# Patient Record
Sex: MALE | Race: BLACK OR AFRICAN AMERICAN | NOT HISPANIC OR LATINO | Employment: FULL TIME | URBAN - METROPOLITAN AREA
[De-identification: names, ages, dates, MRNs, and addresses within clinical notes are randomized per-mention and may not be internally consistent; named-entity substitution may affect disease eponyms.]

---

## 2017-03-20 RX ORDER — INDOMETHACIN 75 MG/1
75 CAPSULE, EXTENDED RELEASE ORAL 2 TIMES DAILY WITH MEALS
Qty: 60 CAPSULE | Refills: 1 | Status: SHIPPED | OUTPATIENT
Start: 2017-03-20 | End: 2017-04-19

## 2017-03-20 RX ORDER — CHLORZOXAZONE 500 MG/1
TABLET ORAL
Qty: 60 TABLET | Refills: 0 | Status: SHIPPED | OUTPATIENT
Start: 2017-03-20

## 2017-03-20 RX ORDER — ESOMEPRAZOLE MAGNESIUM 40 MG/1
40 CAPSULE, DELAYED RELEASE ORAL
Qty: 30 CAPSULE | Refills: 1 | Status: SHIPPED | OUTPATIENT
Start: 2017-03-20 | End: 2018-03-20

## 2017-04-13 ENCOUNTER — OFFICE VISIT (OUTPATIENT)
Dept: SPORTS MEDICINE | Facility: CLINIC | Age: 27
End: 2017-04-13
Payer: COMMERCIAL

## 2017-04-13 DIAGNOSIS — S93.492A SPRAIN OF OTHER LIGAMENT OF LEFT ANKLE, INITIAL ENCOUNTER: Primary | ICD-10-CM

## 2017-04-13 PROCEDURE — 99211 OFF/OP EST MAY X REQ PHY/QHP: CPT | Mod: PBBFAC,PO | Performed by: FAMILY MEDICINE

## 2017-04-13 PROCEDURE — 99999 PR PBB SHADOW E&M-EST. PATIENT-LVL I: CPT | Mod: PBBFAC,,, | Performed by: FAMILY MEDICINE

## 2017-04-13 PROCEDURE — 99499 UNLISTED E&M SERVICE: CPT | Mod: S$PBB,,, | Performed by: ORTHOPAEDIC SURGERY

## 2017-04-13 NOTE — PROGRESS NOTES
C.C. Left ankle pain      ORTHOPAEDIC HISTORY:   1. History of right hamstring strain March 2016 ([facility]).  Missed several games.  Conservative treatment.  No injections.  Returned in same month.  Played without issues rest of season.  No symptoms at end of season.  No current symptoms  2. Right anterior talus contusion (Oct 12, 2016)  3. Left 1st MT dorsal bone contusion (Nov 2016)     Exit Physical 4-    ATFL +ttp, no ttp great toe  g2 translation LEFT ankle  No effusion, no swelling  Exam otherwise normal    Plan:  going to [facility]  Will do ankle strengthening and proprioception after 1 week of RICE   indocin

## 2017-08-30 ENCOUNTER — TELEPHONE (OUTPATIENT)
Dept: SPORTS MEDICINE | Facility: CLINIC | Age: 27
End: 2017-08-30

## 2017-08-30 DIAGNOSIS — Z02.5 ROUTINE SPORTS PHYSICAL EXAM: Primary | ICD-10-CM

## 2017-09-05 ENCOUNTER — HOSPITAL ENCOUNTER (OUTPATIENT)
Dept: CARDIOLOGY | Facility: CLINIC | Age: 27
Discharge: HOME | End: 2017-09-05
Payer: COMMERCIAL

## 2017-09-05 DIAGNOSIS — I36.9 NONRHEUMATIC TRICUSPID VALVE DISORDER: ICD-10-CM

## 2017-09-05 DIAGNOSIS — Z02.5 ROUTINE SPORTS PHYSICAL EXAM: ICD-10-CM

## 2017-09-05 LAB
DIASTOLIC DYSFUNCTION: NO
ESTIMATED PA SYSTOLIC PRESSURE: 23.25
MITRAL VALVE REGURGITATION: NORMAL
RETIRED EF AND QEF - SEE NOTES: 63 (ref 55–65)
TRICUSPID VALVE REGURGITATION: NORMAL

## 2017-09-05 PROCEDURE — 93351 STRESS TTE COMPLETE: CPT | Mod: 26,S$PBB,, | Performed by: INTERNAL MEDICINE

## 2017-09-05 PROCEDURE — 93325 DOPPLER ECHO COLOR FLOW MAPG: CPT | Mod: PBBFAC | Performed by: INTERNAL MEDICINE

## 2017-09-05 PROCEDURE — 93320 DOPPLER ECHO COMPLETE: CPT | Mod: 26,S$PBB,, | Performed by: INTERNAL MEDICINE

## 2017-09-06 ENCOUNTER — CLINICAL SUPPORT (OUTPATIENT)
Dept: INTERNAL MEDICINE | Facility: CLINIC | Age: 27
End: 2017-09-06

## 2017-09-06 ENCOUNTER — OFFICE VISIT (OUTPATIENT)
Dept: SPORTS MEDICINE | Facility: CLINIC | Age: 27
End: 2017-09-06
Payer: COMMERCIAL

## 2017-09-06 ENCOUNTER — OFFICE VISIT (OUTPATIENT)
Dept: SPORTS MEDICINE | Facility: CLINIC | Age: 27
End: 2017-09-06

## 2017-09-06 DIAGNOSIS — Z00.00 ROUTINE GENERAL MEDICAL EXAMINATION AT A HEALTH CARE FACILITY: Primary | ICD-10-CM

## 2017-09-06 DIAGNOSIS — Z00.00 WELLNESS EXAMINATION: Primary | ICD-10-CM

## 2017-09-06 LAB
25(OH)D3+25(OH)D2 SERPL-MCNC: 43 NG/ML
ALBUMIN SERPL BCP-MCNC: 4.4 G/DL
ALP SERPL-CCNC: 83 U/L
ALT SERPL W/O P-5'-P-CCNC: 32 U/L
ANION GAP SERPL CALC-SCNC: 10 MMOL/L
AST SERPL-CCNC: 38 U/L
BACTERIA #/AREA URNS AUTO: ABNORMAL /HPF
BASOPHILS # BLD AUTO: 0.01 K/UL
BASOPHILS NFR BLD: 0.1 %
BILIRUB SERPL-MCNC: 1.3 MG/DL
BILIRUB UR QL STRIP: NEGATIVE
BUN SERPL-MCNC: 23 MG/DL
CALCIUM SERPL-MCNC: 10.1 MG/DL
CHLORIDE SERPL-SCNC: 102 MMOL/L
CHOLEST SERPL-MCNC: 208 MG/DL
CHOLEST/HDLC SERPL: 2.9 {RATIO}
CLARITY UR REFRACT.AUTO: ABNORMAL
CO2 SERPL-SCNC: 28 MMOL/L
COLOR UR AUTO: YELLOW
CREAT SERPL-MCNC: 1.9 MG/DL
DIFFERENTIAL METHOD: ABNORMAL
EOSINOPHIL # BLD AUTO: 0 K/UL
EOSINOPHIL NFR BLD: 0.1 %
ERYTHROCYTE [DISTWIDTH] IN BLOOD BY AUTOMATED COUNT: 13.8 %
EST. GFR  (AFRICAN AMERICAN): 54.3 ML/MIN/1.73 M^2
EST. GFR  (NON AFRICAN AMERICAN): 46.9 ML/MIN/1.73 M^2
GLUCOSE SERPL-MCNC: 57 MG/DL
GLUCOSE UR QL STRIP: NEGATIVE
HCT VFR BLD AUTO: 39.1 %
HDLC SERPL-MCNC: 72 MG/DL
HDLC SERPL: 34.6 %
HGB BLD-MCNC: 13.5 G/DL
HGB UR QL STRIP: NEGATIVE
HYALINE CASTS UR QL AUTO: 6 /LPF
KETONES UR QL STRIP: NEGATIVE
LDLC SERPL CALC-MCNC: 121.2 MG/DL
LEUKOCYTE ESTERASE UR QL STRIP: NEGATIVE
LYMPHOCYTES # BLD AUTO: 0.9 K/UL
LYMPHOCYTES NFR BLD: 10.9 %
MCH RBC QN AUTO: 26.8 PG
MCHC RBC AUTO-ENTMCNC: 34.5 G/DL
MCV RBC AUTO: 78 FL
MICROSCOPIC COMMENT: ABNORMAL
MONOCYTES # BLD AUTO: 0.4 K/UL
MONOCYTES NFR BLD: 4.4 %
NEUTROPHILS # BLD AUTO: 6.7 K/UL
NEUTROPHILS NFR BLD: 84.5 %
NITRITE UR QL STRIP: NEGATIVE
NON-SQ EPI CELLS #/AREA URNS AUTO: <1 /HPF
NONHDLC SERPL-MCNC: 136 MG/DL
PH UR STRIP: 5 [PH] (ref 5–8)
PLATELET # BLD AUTO: 168 K/UL
PMV BLD AUTO: 12.8 FL
POTASSIUM SERPL-SCNC: 5.2 MMOL/L
PROT SERPL-MCNC: 8.5 G/DL
PROT UR QL STRIP: ABNORMAL
RBC # BLD AUTO: 5.03 M/UL
RBC #/AREA URNS AUTO: 0 /HPF (ref 0–4)
SODIUM SERPL-SCNC: 140 MMOL/L
SP GR UR STRIP: 1.02 (ref 1–1.03)
SQUAMOUS #/AREA URNS AUTO: 1 /HPF
TRIGL SERPL-MCNC: 74 MG/DL
URN SPEC COLLECT METH UR: ABNORMAL
UROBILINOGEN UR STRIP-ACNC: NEGATIVE EU/DL
WBC # BLD AUTO: 7.95 K/UL
WBC #/AREA URNS AUTO: 2 /HPF (ref 0–5)

## 2017-09-06 PROCEDURE — 99999 PR PBB SHADOW E&M-EST. PATIENT-LVL I: CPT | Mod: PBBFAC,,, | Performed by: FAMILY MEDICINE

## 2017-09-06 PROCEDURE — 99211 OFF/OP EST MAY X REQ PHY/QHP: CPT | Mod: PBBFAC,PO | Performed by: ORTHOPAEDIC SURGERY

## 2017-09-06 PROCEDURE — 99211 OFF/OP EST MAY X REQ PHY/QHP: CPT | Mod: PBBFAC,27,PO | Performed by: FAMILY MEDICINE

## 2017-09-06 PROCEDURE — 99499 UNLISTED E&M SERVICE: CPT | Mod: S$PBB,,, | Performed by: ORTHOPAEDIC SURGERY

## 2017-09-06 PROCEDURE — 99999 PR PBB SHADOW E&M-EST. PATIENT-LVL I: CPT | Mod: PBBFAC,,, | Performed by: ORTHOPAEDIC SURGERY

## 2017-09-06 PROCEDURE — 82306 VITAMIN D 25 HYDROXY: CPT

## 2017-09-06 PROCEDURE — 81001 URINALYSIS AUTO W/SCOPE: CPT

## 2017-09-06 PROCEDURE — 86480 TB TEST CELL IMMUN MEASURE: CPT

## 2017-09-06 PROCEDURE — 83036 HEMOGLOBIN GLYCOSYLATED A1C: CPT

## 2017-09-06 PROCEDURE — 99214 OFFICE O/P EST MOD 30 MIN: CPT | Mod: S$PBB,,, | Performed by: FAMILY MEDICINE

## 2017-09-06 PROCEDURE — 87591 N.GONORRHOEAE DNA AMP PROB: CPT

## 2017-09-06 PROCEDURE — 85025 COMPLETE CBC W/AUTO DIFF WBC: CPT

## 2017-09-06 PROCEDURE — 80053 COMPREHEN METABOLIC PANEL: CPT

## 2017-09-06 PROCEDURE — 80061 LIPID PANEL: CPT

## 2017-09-06 NOTE — PROGRESS NOTES
ZAHRA.C. Pre-particpation physical    27 y.o. year-old [facility]       RECENT HISTORY:   1. No current or recent symptoms    ORTHOPAEDIC HISTORY:   1. History of right hamstring strain March 2016 ([facility]).  Missed several games.  Conservative treatment.  No injections.  Returned in same month.  Played without issues rest of season.  No symptoms at end of season.  No current symptoms  2. Right anterior talus contusion (Oct 12, 2016)  3. Left 1st MT dorsal bone contusion (Nov 2016)     PHYSICAL EXAM:      GEN: Alert and oriented x3. In no apparent distress.     Well-developed, well-nourished male. Observation of ears, eyes, and nose   reveal no gross abnormalities.  See ophthal report for full eye exam    CERVICAL SPINE: Full range of motion with no deficits.     BILATERAL SHOULDER EXAM: shows full symmetric range of motion with 5/5   strength throughout the supraspinatus and infraspinatus, deltoid, and   subscapularis. No evidence of instability.     BILATERAL ELBOW EXAM: Shows full and symmetric extension/flexion, and   pronation/supination, and varus/valgus stability. Negative posterolateral   rotatory instability.     BILATERAL WRIST EXAM: Shows normal and symmetric full flexion/extension   and radial/ulnar deviation.     HAND EXAM: Shows full range of motion to bilateral hands and full   strength and stability to all fingers.     LUMBAR SPINE EXAM: Shows no evidence of any scoliosis. He has full   flexion and extension with no pain and no apparent tenderness to the   spine. Negative straight leg raise bilaterally.     HIP EXAM: Shows full range of motion without pain or signs of impingement.   Symmetric internal rotation without pain.  Has 5/5 hip flexion strength and 5/5 strength testing to the entire lower extremity.     KNEE EXAM: Examination of bilateral knees shows symmetric range of motion   0 to 145 degrees with negative Lachman and stable to varus-valgus stress   testing. No joint line  tenderness. Good quad tone. No effusion of either knee.     Bilateral legs: NTTP over tibiae mid shaft or distal.      FOOT AND ANKLE EXAM: Shows good range of motion to bilateral ankles with   no neurologic deficit. There is a 5/5 strength exam throughout the foot   and ankle exam. There is a normal arch on both feet. There is no tenderness to palpation along either foot. - turf toe exam      IMAGING:   Xrays: No evidence of any fracture or dislocation.    CT scan:    MRI:      ASSESSMENT:   Orthopedically cleared for participation for the [place] [facility] for the 2017-18 season.

## 2017-09-07 LAB
C TRACH DNA SPEC QL NAA+PROBE: NOT DETECTED
ESTIMATED AVG GLUCOSE: 103 MG/DL
HBA1C MFR BLD HPLC: 5.2 %
N GONORRHOEA DNA SPEC QL NAA+PROBE: NOT DETECTED

## 2017-09-08 LAB
MITOGEN NIL: 1.46 IU/ML
NIL: 0.06 IU/ML
TB ANTIGEN NIL: -0.01 IU/ML
TB ANTIGEN: 0.05 IU/ML
TB GOLD: NEGATIVE

## 2017-09-28 NOTE — PROGRESS NOTES
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      Assessment:   #1 pre season medical evaluation   #2 ongoing medical concerns:    A) none    Imaging studies reviewed:   none     Plan:     Lionel Dillard is cleared [facility]     Physical exam, routine Z00.00      Reviewed 17.07.19

## 2018-01-12 RX ORDER — MELOXICAM 15 MG/1
15 TABLET ORAL DAILY
Qty: 30 TABLET | Refills: 1 | Status: SHIPPED | OUTPATIENT
Start: 2018-01-12

## 2018-01-12 RX ORDER — CHLORZOXAZONE 500 MG/1
500 TABLET ORAL 3 TIMES DAILY PRN
Qty: 60 TABLET | Refills: 1 | Status: SHIPPED | OUTPATIENT
Start: 2018-01-12 | End: 2018-01-22

## 2018-01-27 ENCOUNTER — HOSPITAL ENCOUNTER (OUTPATIENT)
Dept: RADIOLOGY | Facility: HOSPITAL | Age: 28
Discharge: HOME | End: 2018-01-27
Attending: ORTHOPAEDIC SURGERY
Payer: COMMERCIAL

## 2018-01-27 DIAGNOSIS — M25.561 ACUTE PAIN OF RIGHT KNEE: Primary | ICD-10-CM

## 2018-01-27 DIAGNOSIS — M25.561 ACUTE PAIN OF RIGHT KNEE: ICD-10-CM

## 2018-01-27 PROCEDURE — 73562 X-RAY EXAM OF KNEE 3: CPT | Mod: TC,RT

## 2018-01-27 PROCEDURE — 73721 MRI JNT OF LWR EXTRE W/O DYE: CPT | Mod: 26,RT,, | Performed by: RADIOLOGY

## 2018-01-27 PROCEDURE — 73562 X-RAY EXAM OF KNEE 3: CPT | Mod: 26,RT,, | Performed by: RADIOLOGY

## 2018-01-27 PROCEDURE — 73721 MRI JNT OF LWR EXTRE W/O DYE: CPT | Mod: TC,RT

## 2018-01-29 ENCOUNTER — DOCUMENTATION ONLY (OUTPATIENT)
Dept: SPORTS MEDICINE | Facility: CLINIC | Age: 28
End: 2018-01-29

## 2018-01-29 NOTE — PROGRESS NOTES
C.C. Right knee pain    27 y.o. year-old [place]  with right knee pain.      RECENT HISTORY:   1. Sustained blow to right fibular head area during game 1-26-17.  Was able to continue and finish game.  Slightly better next day.    ORTHOPAEDIC HISTORY:   1. History of right hamstring strain March 2016 ([facility]).  Missed several games.  Conservative treatment.  No injections.  Returned in same month.  Played without issues rest of season.  No symptoms at end of season.  No current symptoms  2. Right anterior talus contusion (Oct 12, 2016)  3. Left 1st MT dorsal bone contusion (Nov 2016)     PHYSICAL EXAM:      GEN: Alert and oriented x3. In no apparent distress.     Well-developed, well-nourished male. Observation of ears, eyes, and nose   reveal no gross abnormalities.  See ophthal report for full eye exam    CERVICAL SPINE: Full range of motion with no deficits.     BILATERAL SHOULDER EXAM: shows full symmetric range of motion with 5/5   strength throughout the supraspinatus and infraspinatus, deltoid, and   subscapularis. No evidence of instability.     BILATERAL ELBOW EXAM: Shows full and symmetric extension/flexion, and   pronation/supination, and varus/valgus stability. Negative posterolateral   rotatory instability.     BILATERAL WRIST EXAM: Shows normal and symmetric full flexion/extension   and radial/ulnar deviation.     HAND EXAM: Shows full range of motion to bilateral hands and full   strength and stability to all fingers.     LUMBAR SPINE EXAM: Shows no evidence of any scoliosis. He has full   flexion and extension with no pain and no apparent tenderness to the   spine. Negative straight leg raise bilaterally.     HIP EXAM: Shows full range of motion without pain or signs of impingement.   Symmetric internal rotation without pain.  Has 5/5 hip flexion strength and 5/5 strength testing to the entire lower extremity.     KNEE EXAM: Examination of bilateral knees shows symmetric range of  motion   0 to 145 degrees with negative Lachman and stable to varus-valgus stress   testing. No joint line tenderness. Good quad tone. No effusion of either knee.   + TTP right fibular head, stable tib-fib joint    Bilateral legs: NTTP over tibiae mid shaft or distal.      FOOT AND ANKLE EXAM: Shows good range of motion to bilateral ankles with   no neurologic deficit. There is a 5/5 strength exam throughout the foot   and ankle exam. There is a normal arch on both feet. There is no tenderness to palpation along either foot. - turf toe exam      IMAGING:   Xrays: No evidence of any fracture or dislocation.        MRI:  MRI of the  RIGHT  knee without contrast.    Technique: The following sequences were obtained: Localizer; coronal PD FS and PD; sagittal T1, PD FS; axial PD FS.  Additional T1 and inversion recovery sequences were obtained of the tibia and fibula  Comparison:  No    Findings:     Menisci:  There is no tear of the medial or lateral meniscus.The root attachment of the menisci are normal.       Ligaments:  ACL and PCL are intact.MCL, and LCL complex are intact.      Tendons:  The quadriceps and patellar tendons are normal with note made of distal quadriceps enthesopathy as confirmed on plain film examination 1/27/2018.     Cartilage:   Patellofemoral: The medial and lateral patellar facets demonstrate normal articular cartilage. The retinacular attachments are intact.  Medial tibiofemoral: Articular cartilage is maintained.  Lateral tibiofemoral: Articular cartilage is maintained.    Bone: No fracture or marrow replacing process.    Miscellaneous: There is no joint effusion; small amount of joint fluid identified within physiologic limits of normal.Posterior medial and posterior lateral corners of the knee are intact.The gastrocnemius muscles are normal.     *At the point of of marker, referencing patient's point of maximal tenderness, there is evidence for edema identified at the level of the  anterolateral aspect of the knee, just inferior to the joint line, with myofascial edema, with additional edema identified level of the tibialis anterior, extensor digitorum longus, and fibular longus consistent with soft tissue injury/contusion and likely concomitant proximal muscular contusion at those levels.    Findings discussed with Dr. Lopez at time of examination.   Impression         No evidence for fibular fracture.    Soft tissue contusion anterolaterally, inferior to joint line as noted above.            ASSESSMENT:   Right fibular head contusion, MRi and xrays to r/o fracture and contusion and eval soft tissue damage    Mobic

## 2018-05-09 ENCOUNTER — OFFICE VISIT (OUTPATIENT)
Dept: SPORTS MEDICINE | Facility: CLINIC | Age: 28
End: 2018-05-09
Payer: COMMERCIAL

## 2018-05-09 DIAGNOSIS — M25.561 ACUTE PAIN OF RIGHT KNEE: Primary | ICD-10-CM

## 2018-05-09 PROCEDURE — 99211 OFF/OP EST MAY X REQ PHY/QHP: CPT | Mod: PBBFAC,PO | Performed by: ORTHOPAEDIC SURGERY

## 2018-05-09 PROCEDURE — 99999 PR PBB SHADOW E&M-EST. PATIENT-LVL I: CPT | Mod: PBBFAC,,, | Performed by: ORTHOPAEDIC SURGERY

## 2018-05-09 PROCEDURE — 99214 OFFICE O/P EST MOD 30 MIN: CPT | Mod: S$PBB,,, | Performed by: ORTHOPAEDIC SURGERY

## 2018-05-09 NOTE — PROGRESS NOTES
DARRIUS. Right knee pain    28 y.o. year-old [place]  with right knee pain.      RECENT HISTORY:    Right knee medial quadriceps tendon pain over the last three weeks. Pt with acute worsening 5/8/18    ORTHOPAEDIC HISTORY:   1. History of right hamstring strain March 2016 ([facility]).  Missed several games.  Conservative treatment.  No injections.  Returned in same month.  Played without issues rest of season.  No symptoms at end of season.  No current symptoms  2. Right anterior talus contusion (Oct 12, 2016)  3. Left 1st MT dorsal bone contusion (Nov 2016)   Sustained blow to right fibular head area during game 1-26-17.  Was able to continue and finish game.  Slightly better next day.  PHYSICAL EXAM:      GEN: Alert and oriented x3. In no apparent distress.     Well-developed, well-nourished male. Observation of ears, eyes, and nose   reveal no gross abnormalities.  See ophthal report for full eye exam    CERVICAL SPINE: Full range of motion with no deficits.     BILATERAL SHOULDER EXAM: shows full symmetric range of motion with 5/5   strength throughout the supraspinatus and infraspinatus, deltoid, and   subscapularis. No evidence of instability.     BILATERAL ELBOW EXAM: Shows full and symmetric extension/flexion, and   pronation/supination, and varus/valgus stability. Negative posterolateral   rotatory instability.     BILATERAL WRIST EXAM: Shows normal and symmetric full flexion/extension   and radial/ulnar deviation.     HAND EXAM: Shows full range of motion to bilateral hands and full   strength and stability to all fingers.     LUMBAR SPINE EXAM: Shows no evidence of any scoliosis. He has full   flexion and extension with no pain and no apparent tenderness to the   spine. Negative straight leg raise bilaterally.     HIP EXAM: Shows full range of motion without pain or signs of impingement.   Symmetric internal rotation without pain.  Has 5/5 hip flexion strength and 5/5 strength testing to the  entire lower extremity.     KNEE EXAM: Examination of bilateral knees shows symmetric range of motion   0 to 145 degrees with negative Lachman and stable to varus-valgus stress   testing. No joint line tenderness. Good quad tone. No effusion of either knee.   + TTP right quad tendon    Bilateral legs: NTTP over tibiae mid shaft or distal.      FOOT AND ANKLE EXAM: Shows good range of motion to bilateral ankles with   no neurologic deficit. There is a 5/5 strength exam throughout the foot   and ankle exam. There is a normal arch on both feet. There is no tenderness to palpation along either foot. - turf toe exam      IMAGING:   Xrays: No evidence of any fracture or dislocation.        MRI:  MRI of the  RIGHT  knee without contrast.    Technique: The following sequences were obtained: Localizer; coronal PD FS and PD; sagittal T1, PD FS; axial PD FS.  Additional T1 and inversion recovery sequences were obtained of the tibia and fibula  Comparison:  No    Findings:     Menisci:  There is no tear of the medial or lateral meniscus.The root attachment of the menisci are normal.       Ligaments:  ACL and PCL are intact.MCL, and LCL complex are intact.      Tendons:  The quadriceps and patellar tendons are normal with note made of distal quadriceps enthesopathy as confirmed on plain film examination 1/27/2018.     Cartilage:   Patellofemoral: The medial and lateral patellar facets demonstrate normal articular cartilage. The retinacular attachments are intact.  Medial tibiofemoral: Articular cartilage is maintained.  Lateral tibiofemoral: Articular cartilage is maintained.    Bone: No fracture or marrow replacing process.    Miscellaneous: There is no joint effusion; small amount of joint fluid identified within physiologic limits of normal.Posterior medial and posterior lateral corners of the knee are intact.The gastrocnemius muscles are normal.     *At the point of of marker, referencing patient's point of maximal  tenderness, there is evidence for edema identified at the level of the anterolateral aspect of the knee, just inferior to the joint line, with myofascial edema, with additional edema identified level of the tibialis anterior, extensor digitorum longus, and fibular longus consistent with soft tissue injury/contusion and likely concomitant proximal muscular contusion at those levels.    Findings discussed with Dr. Lopez at time of examination.   Impression         No evidence for fibular fracture.    Soft tissue contusion anterolaterally, inferior to joint line as noted above.            ASSESSMENT:   Right knee MRI

## 2018-05-11 ENCOUNTER — HOSPITAL ENCOUNTER (OUTPATIENT)
Dept: RADIOLOGY | Facility: HOSPITAL | Age: 28
Discharge: HOME | End: 2018-05-11
Attending: ORTHOPAEDIC SURGERY
Payer: COMMERCIAL

## 2018-05-11 DIAGNOSIS — M25.561 ACUTE PAIN OF RIGHT KNEE: ICD-10-CM

## 2018-05-11 PROCEDURE — 73721 MRI JNT OF LWR EXTRE W/O DYE: CPT | Mod: 26,RT,, | Performed by: RADIOLOGY

## 2018-05-11 PROCEDURE — 73721 MRI JNT OF LWR EXTRE W/O DYE: CPT | Mod: TC,RT

## 2018-08-31 ENCOUNTER — TELEPHONE (OUTPATIENT)
Dept: SPORTS MEDICINE | Facility: CLINIC | Age: 28
End: 2018-08-31

## 2018-08-31 DIAGNOSIS — Z02.5 ROUTINE SPORTS PHYSICAL EXAM: Primary | ICD-10-CM

## 2018-09-14 ENCOUNTER — HOSPITAL ENCOUNTER (OUTPATIENT)
Dept: CARDIOLOGY | Facility: CLINIC | Age: 28
Discharge: HOME | End: 2018-09-14
Attending: FAMILY MEDICINE
Payer: COMMERCIAL

## 2018-09-14 DIAGNOSIS — Z02.5 ROUTINE SPORTS PHYSICAL EXAM: ICD-10-CM

## 2018-09-14 LAB
DIASTOLIC DYSFUNCTION: NO
ESTIMATED PA SYSTOLIC PRESSURE: 19.32
MITRAL VALVE REGURGITATION: NORMAL
RETIRED EF AND QEF - SEE NOTES: 58 (ref 55–65)
TRICUSPID VALVE REGURGITATION: NORMAL

## 2018-09-14 PROCEDURE — 93325 DOPPLER ECHO COLOR FLOW MAPG: CPT | Mod: PBBFAC | Performed by: INTERNAL MEDICINE

## 2018-09-14 PROCEDURE — 93320 DOPPLER ECHO COMPLETE: CPT | Mod: 26,S$PBB,, | Performed by: INTERNAL MEDICINE

## 2018-09-14 PROCEDURE — 93351 STRESS TTE COMPLETE: CPT | Mod: 26,S$PBB,, | Performed by: INTERNAL MEDICINE

## 2018-09-25 ENCOUNTER — OFFICE VISIT (OUTPATIENT)
Dept: SPORTS MEDICINE | Facility: CLINIC | Age: 28
End: 2018-09-25
Payer: COMMERCIAL

## 2018-09-25 ENCOUNTER — CLINICAL SUPPORT (OUTPATIENT)
Dept: INTERNAL MEDICINE | Facility: CLINIC | Age: 28
End: 2018-09-25

## 2018-09-25 ENCOUNTER — OFFICE VISIT (OUTPATIENT)
Dept: SPORTS MEDICINE | Facility: CLINIC | Age: 28
End: 2018-09-25

## 2018-09-25 VITALS
HEIGHT: 76 IN | SYSTOLIC BLOOD PRESSURE: 141 MMHG | BODY MASS INDEX: 24.84 KG/M2 | HEART RATE: 71 BPM | WEIGHT: 204 LBS | DIASTOLIC BLOOD PRESSURE: 77 MMHG

## 2018-09-25 DIAGNOSIS — Z00.00 ROUTINE MEDICAL EXAM: Primary | ICD-10-CM

## 2018-09-25 DIAGNOSIS — Z00.00 WELLNESS EXAMINATION: Primary | ICD-10-CM

## 2018-09-25 DIAGNOSIS — Z00.00 ROUTINE GENERAL MEDICAL EXAMINATION AT A HEALTH CARE FACILITY: Primary | ICD-10-CM

## 2018-09-25 DIAGNOSIS — Z02.5 ROUTINE SPORTS EXAMINATION: ICD-10-CM

## 2018-09-25 LAB
25(OH)D3+25(OH)D2 SERPL-MCNC: 41 NG/ML
ALBUMIN SERPL BCP-MCNC: 4 G/DL
ALP SERPL-CCNC: 92 U/L
ALT SERPL W/O P-5'-P-CCNC: 23 U/L
ANION GAP SERPL CALC-SCNC: 9 MMOL/L
AST SERPL-CCNC: 26 U/L
BASOPHILS # BLD AUTO: 0.02 K/UL
BASOPHILS NFR BLD: 0.5 %
BILIRUB SERPL-MCNC: 0.8 MG/DL
BILIRUB UR QL STRIP: NEGATIVE
BUN SERPL-MCNC: 16 MG/DL
CALCIUM SERPL-MCNC: 9.8 MG/DL
CHLORIDE SERPL-SCNC: 102 MMOL/L
CHOLEST SERPL-MCNC: 214 MG/DL
CHOLEST/HDLC SERPL: 3.2 {RATIO}
CLARITY UR REFRACT.AUTO: CLEAR
CO2 SERPL-SCNC: 29 MMOL/L
COLOR UR AUTO: YELLOW
CREAT SERPL-MCNC: 1.2 MG/DL
DIFFERENTIAL METHOD: ABNORMAL
EOSINOPHIL # BLD AUTO: 0.1 K/UL
EOSINOPHIL NFR BLD: 1.4 %
ERYTHROCYTE [DISTWIDTH] IN BLOOD BY AUTOMATED COUNT: 13.6 %
EST. GFR  (AFRICAN AMERICAN): >60 ML/MIN/1.73 M^2
EST. GFR  (NON AFRICAN AMERICAN): >60 ML/MIN/1.73 M^2
ESTIMATED AVG GLUCOSE: 108 MG/DL
GLUCOSE SERPL-MCNC: 113 MG/DL
GLUCOSE UR QL STRIP: NEGATIVE
HBA1C MFR BLD HPLC: 5.4 %
HCT VFR BLD AUTO: 41.8 %
HDLC SERPL-MCNC: 67 MG/DL
HDLC SERPL: 31.3 %
HGB BLD-MCNC: 14.3 G/DL
HGB UR QL STRIP: NEGATIVE
IMM GRANULOCYTES # BLD AUTO: 0.01 K/UL
IMM GRANULOCYTES NFR BLD AUTO: 0.2 %
KETONES UR QL STRIP: NEGATIVE
LDLC SERPL CALC-MCNC: 129.4 MG/DL
LEUKOCYTE ESTERASE UR QL STRIP: NEGATIVE
LYMPHOCYTES # BLD AUTO: 1.7 K/UL
LYMPHOCYTES NFR BLD: 38.2 %
MCH RBC QN AUTO: 27.1 PG
MCHC RBC AUTO-ENTMCNC: 34.2 G/DL
MCV RBC AUTO: 79 FL
MONOCYTES # BLD AUTO: 0.3 K/UL
MONOCYTES NFR BLD: 6.2 %
NEUTROPHILS # BLD AUTO: 2.3 K/UL
NEUTROPHILS NFR BLD: 53.5 %
NITRITE UR QL STRIP: NEGATIVE
NONHDLC SERPL-MCNC: 147 MG/DL
NRBC BLD-RTO: 0 /100 WBC
PH UR STRIP: 7 [PH] (ref 5–8)
PLATELET # BLD AUTO: 147 K/UL
PMV BLD AUTO: ABNORMAL FL
POTASSIUM SERPL-SCNC: 4.2 MMOL/L
PROT SERPL-MCNC: 8 G/DL
PROT UR QL STRIP: NEGATIVE
RBC # BLD AUTO: 5.28 M/UL
SODIUM SERPL-SCNC: 140 MMOL/L
SP GR UR STRIP: 1.01 (ref 1–1.03)
TRIGL SERPL-MCNC: 88 MG/DL
URN SPEC COLLECT METH UR: NORMAL
UROBILINOGEN UR STRIP-ACNC: NEGATIVE EU/DL
WBC # BLD AUTO: 4.34 K/UL

## 2018-09-25 PROCEDURE — 99212 OFFICE O/P EST SF 10 MIN: CPT | Mod: PBBFAC,PO | Performed by: FAMILY MEDICINE

## 2018-09-25 PROCEDURE — 83036 HEMOGLOBIN GLYCOSYLATED A1C: CPT

## 2018-09-25 PROCEDURE — 80053 COMPREHEN METABOLIC PANEL: CPT

## 2018-09-25 PROCEDURE — 81003 URINALYSIS AUTO W/O SCOPE: CPT

## 2018-09-25 PROCEDURE — 80061 LIPID PANEL: CPT

## 2018-09-25 PROCEDURE — 85025 COMPLETE CBC W/AUTO DIFF WBC: CPT

## 2018-09-25 PROCEDURE — 86480 TB TEST CELL IMMUN MEASURE: CPT

## 2018-09-25 PROCEDURE — 99999 PR PBB SHADOW E&M-EST. PATIENT-LVL II: CPT | Mod: PBBFAC,,, | Performed by: FAMILY MEDICINE

## 2018-09-25 PROCEDURE — 99214 OFFICE O/P EST MOD 30 MIN: CPT | Mod: S$PBB,,, | Performed by: FAMILY MEDICINE

## 2018-09-25 PROCEDURE — 99214 OFFICE O/P EST MOD 30 MIN: CPT | Mod: S$PBB,,, | Performed by: ORTHOPAEDIC SURGERY

## 2018-09-25 PROCEDURE — 87491 CHLMYD TRACH DNA AMP PROBE: CPT

## 2018-09-25 PROCEDURE — 82306 VITAMIN D 25 HYDROXY: CPT

## 2018-09-25 NOTE — PROGRESS NOTES
MALAIKA Pre-particpation physical    28 y.o. year-old [place]       ORTHOPAEDIC HISTORY:   1. History of right hamstring strain March 2016 ([facility]).  Missed several games.  Conservative treatment.  No injections.  Returned in same month.  Played without issues rest of season.  No symptoms at end of season.  No current symptoms  2. Right anterior talus contusion (Oct 12, 2016)  3. Left 1st MT dorsal bone contusion (Nov 2016)   4. Fibular head contusion 1-26-17, no missed time      PHYSICAL EXAM:      GEN: Alert and oriented x3. In no apparent distress.     Well-developed, well-nourished male. Observation of ears, eyes, and nose   reveal no gross abnormalities.  See ophthal report for full eye exam    CERVICAL SPINE: Full range of motion with no deficits.     BILATERAL SHOULDER EXAM: shows full symmetric range of motion with 5/5   strength throughout the supraspinatus and infraspinatus, deltoid, and   subscapularis. No evidence of instability.     BILATERAL ELBOW EXAM: Shows full and symmetric extension/flexion, and   pronation/supination, and varus/valgus stability. Negative posterolateral   rotatory instability.     BILATERAL WRIST EXAM: Shows normal and symmetric full flexion/extension   and radial/ulnar deviation.     HAND EXAM: Shows full range of motion to bilateral hands and full   strength and stability to all fingers.     LUMBAR SPINE EXAM: Shows no evidence of any scoliosis. He has full   flexion and extension with no pain and no apparent tenderness to the   spine. Negative straight leg raise bilaterally.     HIP EXAM: Shows full range of motion without pain or signs of impingement.   Symmetric internal rotation without pain.  Has 5/5 hip flexion strength and 5/5 strength testing to the entire lower extremity.   + R Step down test  + L step down test  + Bridge test  45 degrees internal rotation with axial load bilaterally    KNEE EXAM: Examination of bilateral knees shows symmetric range of  motion   0 to 145 degrees with negative Lachman and stable to varus-valgus stress   testing. No joint line tenderness. Good quad tone. No effusion of either knee.     Bilateral legs: NTTP over tibiae mid shaft or distal.      FOOT AND ANKLE EXAM: Shows good range of motion to bilateral ankles with   no neurologic deficit. There is a 5/5 strength exam throughout the foot   and ankle exam. There is a normal arch on both feet. There is no tenderness to palpation along either foot. - turf toe exam  L great toe with loss of 10 degrees extension, symmetric flexion  Stable Lachman  L Medial 1st MTP capsular mild edema    IMAGING:   Xrays: NA    CT scan: NA    MRI: NA      ASSESSMENT:   Orthopedically cleared for participation for the [place] [facility] for the 2018-19 season.

## 2018-09-25 NOTE — LETTER
October 3, 2018      [facility]            [facility]  [address]  [facility] [address]  Phone: [phone]          Patient: Lionel Dillard   MR Number: [id number]   YOB: 1990   Date of Visit: 9/25/2018       Dear [facility] :    Thank you for referring Lionel Dillard to me for evaluation. Attached you will find relevant portions of my assessment and plan of care.    If you have questions, please do not hesitate to call me. I look forward to following Lionel Dillard along with you.    Sincerely,    Caterina Lopez MD    Enclosure  CC:  No Recipients    If you would like to receive this communication electronically, please contact [email or web address] or [phone] to request more information on [employer] Link access.    For providers and/or their staff who would like to refer a patient to [facility], please contact us through our one-stop-shop provider referral line, [facility]resp[facility], at [phone].    If you feel you have received this communication in error or would no longer like to receive these types of communications, please e-mail [email or web address]

## 2018-09-26 LAB
C TRACH DNA SPEC QL NAA+PROBE: NOT DETECTED
N GONORRHOEA DNA SPEC QL NAA+PROBE: NOT DETECTED

## 2018-09-26 NOTE — PROGRESS NOTES
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     Sports-relevant past history and family history:  History of cardiovascular pathology: no  Family history of cardiovascular pathology at a young age: no  History of pulmonary pathology: no  History of asthma: no  History of concussion: no  Chronic medication use: no  Chronic supplement / vitamin use: one-a-day vitamin  Other current medical concerns: no  Other history per PPE: History Form (see electronic medical record)    Assessment:   #1 pre season medical evaluation  Concussion baseline evaluation per [facility]ON[facility] protocol: last completed 18.09.25  Stress echocardiogram per [facility] Patricio protocol: last performed with no evidence of stress induced myocardial ischemia on: 18.09.14    #2 ongoing medical issues:   A) none    Imaging studies reviewed as part of medical evaluation:   none    Plan:  #1  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    #2  A)    Physical exam, routine Z00.00  Sports physical Z02.5    Template reviewed 18.09.25    References:  The Preparticipation Sports Evaluation, Sept 2015  https://www.aafp.org/afp/2015/0901/p371.html    Preparticipation Physical Evaluations in Sports: A Systematic Review of Current Recommendations, July 2014  [email or web address]    Preparticipation Physical Evaluation consensus document, 2010  https://www.aafp.org/dam/AAFP/documents/patient_care/fitness/zvvnxwxefsodtx9110.pdf  [email or web address]/pdf%[email or web address]/[email or web address].pdf

## 2018-09-27 LAB
M TB IFN-G CD4+ BCKGRND COR BLD-ACNC: -0.01 IU/ML
MITOGEN IGNF BCKGRD COR BLD-ACNC: 9.02 IU/ML
MITOGEN IGNF BCKGRD COR BLD-ACNC: NEGATIVE [IU]/ML
NIL: 0.05 IU/ML
TB2 - NIL: -0.01 IU/ML

## 2018-10-25 RX ORDER — CHOLECALCIFEROL (VITAMIN D3) 1250 MCG
1 TABLET ORAL
Qty: 20 TABLET | Refills: 3 | COMMUNITY
Start: 2018-10-25 | End: 2019-03-08

## 2018-11-08 RX ORDER — CHOLECALCIFEROL (VITAMIN D3) 1250 MCG
1 TABLET ORAL
Qty: 20 TABLET | Refills: 3 | COMMUNITY
Start: 2018-11-08 | End: 2019-03-22

## 2018-12-11 ENCOUNTER — OFFICE VISIT (OUTPATIENT)
Dept: SPORTS MEDICINE | Facility: CLINIC | Age: 28
End: 2018-12-11
Payer: COMMERCIAL

## 2018-12-11 VITALS — WEIGHT: 204 LBS | HEIGHT: 76 IN | BODY MASS INDEX: 24.84 KG/M2

## 2018-12-11 DIAGNOSIS — L91.8 INFLAMED ACROCHORDON: Primary | ICD-10-CM

## 2018-12-11 PROCEDURE — 99214 OFFICE O/P EST MOD 30 MIN: CPT | Mod: 25,S$GLB,, | Performed by: FAMILY MEDICINE

## 2018-12-11 PROCEDURE — 99999 PR PBB SHADOW E&M-EST. PATIENT-LVL II: CPT | Mod: PBBFAC,,, | Performed by: FAMILY MEDICINE

## 2018-12-11 PROCEDURE — 11200 RMVL SKIN TAGS UP TO&INC 15: CPT | Mod: S$GLB,,, | Performed by: FAMILY MEDICINE

## 2018-12-11 PROCEDURE — 3008F BODY MASS INDEX DOCD: CPT | Mod: CPTII,S$GLB,, | Performed by: FAMILY MEDICINE

## 2018-12-11 NOTE — PROGRESS NOTES
"History of Present Illness (HPI):   #1  Main symptom: lesion on groin, skintag  [Onset] duration: noticed weeks ago  [Palliative] modifying factors:    [Provocative] modifying factors:   Prior: none  Progression: becoming more inflammed  Quality: sharp pain  [Radiation] associated signs and symptoms: - / +  -fever, -chills, -night sweats  -fatigue  -headache  -visual changes, -red eyes, -eye discomfort  -sinus congestion, -rhinorrhea, -epistaxis  -cough, -productive cough, -hemoptysis  -nausea, -emesis, -hematemesis  -diarrhea, -melena, -hematochezia  Severity: per nursing documentation  [Sick contacts]:   Timing: rubbed against it hard last evening, "tore" the skin tag a bit, minimal associated bleeding  [Trauma] context:    AAFP/FPM: Pocket Guide Documentation Guidelines, (c)2014    (57429=7+, 76614=8+)    Physical Examination (PE):  Vital signs reviewed   Afebrile, normotensive, normocardic   Constitution: No apparent distress  HEENT:    Head: atraumatic, normocephalic   Eyes: no conjunctivitis    Ears: EAC clear, TM atraumatic, no hemotympanum, no middle ear discharge   Nose: no nasal congestion noted   Throat: erythematous, without tonsillar edema or tonsillar exudate  Neck:    Soft   Non tender   Lymphadenopathy  Pulmonary:   LFCTAB  Cardiac:   Regular rate and rhythm   No murmurs  Abdomen:   Soft   Non tender    Assessment:   #1 acrochordon, small, midline superior groin    No evidence of infection    Imaging studies reviewed:   None    Plan:  Discussed risks, benefits, alternatives to incision   Reassuring evaluation. 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 sports, work) restrictions: as tolerated   school/vocation:     Follow up per pt  Should symptoms worsen or fail to resolve, consider:  Revisiting the above options  "

## 2018-12-11 NOTE — PROGRESS NOTES
Description of procedure: Acrochordon (skin tag) removal    A) Surgical time out  A surgical time out was performed, including verification of patient ID, procedure to be performed, site and side, availability of information and equipment, review of safety issues, and the patients agreement and consent.     B) Procedure  Skin was cleaned w/ ChloroPrep  Skin was anesthetized w/ vapocoolant, and then with 1% lidocaine  Acrochordon was excised with sterile scissors     D) Post-procedure care  Following the procedure, a sterile bandage was placed over the incision site.     Complications: none     Estimated blood loss from injection procedure: none     Disposition  The patient tolerated the procedure well and there were no immediate complications. The patient was instructed to call the clinic immediately for any mild to moderate adverse side effects, or to call 911 in the event of an emergency.

## 2019-01-07 ENCOUNTER — TELEPHONE (OUTPATIENT)
Dept: SPORTS MEDICINE | Facility: CLINIC | Age: 29
End: 2019-01-07

## 2019-01-07 RX ORDER — CLOTRIMAZOLE AND BETAMETHASONE DIPROPIONATE 10; .64 MG/G; MG/G
CREAM TOPICAL 2 TIMES DAILY
Qty: 1 TUBE | Refills: 1 | Status: SHIPPED | OUTPATIENT
Start: 2019-01-07

## 2019-01-07 NOTE — TELEPHONE ENCOUNTER
"History of Present Illness (HPI):   #1  Main symptom: rash on groin  [Onset] duration: x "several days"  [Palliative] modifying factors:    [Provocative] modifying factors:   Prior:   Progression:   Quality: burning, itching  [Radiation] associated signs and symptoms: - / +  -fever, -chills, -night sweats  -fatigue  -headache  -visual changes, -red eyes, -eye discomfort  -sinus congestion, -rhinorrhea, -epistaxis  -cough, -productive cough, -hemoptysis  -nausea, -emesis, -hematemesis  -diarrhea, -melena, -hematochezia  Severity: per nursing documentation  [Sick contacts]:   Timing:   [Trauma] context:    AAFP/FPM: Pocket Guide Documentation Guidelines, (c)2014    (54437=1+, 42951=5+)    Physical Examination (PE):  N/a    Assessment:   #1 suspected tinea cruris     Imaging studies reviewed:   None    Plan:  Begin Lotrisone x 2-4 wks bid  Discussed the importance of cleaning and drying the area multiple times a day    Follow up prn  Should symptoms worsen or fail to resolve, consider:  Revisiting the above options    "

## 2019-01-22 ENCOUNTER — HOSPITAL ENCOUNTER (OUTPATIENT)
Dept: RADIOLOGY | Facility: HOSPITAL | Age: 29
Discharge: HOME | End: 2019-01-22
Attending: ORTHOPAEDIC SURGERY
Payer: COMMERCIAL

## 2019-01-22 DIAGNOSIS — S80.02XA CONTUSION OF LEFT KNEE, INITIAL ENCOUNTER: ICD-10-CM

## 2019-01-22 DIAGNOSIS — S80.02XA CONTUSION OF LEFT KNEE, INITIAL ENCOUNTER: Primary | ICD-10-CM

## 2019-01-22 PROCEDURE — 73721 MRI JNT OF LWR EXTRE W/O DYE: CPT | Mod: TC,LT

## 2019-01-22 PROCEDURE — 73721 MRI JNT OF LWR EXTRE W/O DYE: CPT | Mod: 26,LT,, | Performed by: RADIOLOGY

## 2019-01-22 PROCEDURE — 73721 MRI KNEE WITHOUT CONTRAST LEFT: ICD-10-PCS | Mod: 26,LT,, | Performed by: RADIOLOGY

## 2019-01-22 NOTE — PROGRESS NOTES
DARRIUS. Left knee pain    28 y.o. year-old [employer] , got two blows to left knee VMO area in successive plays during game     Missed 2 games, cont pain.    ORTHOPAEDIC HISTORY:   1. History of right hamstring strain March 2016 ([facility]).  Missed several games.  Conservative treatment.  No injections.  Returned in same month.  Played without issues rest of season.  No symptoms at end of season.  No current symptoms  2. Right anterior talus contusion (Oct 12, 2016)  3. Left 1st MT dorsal bone contusion (Nov 2016)   4. Fibular head contusion 1-26-17, no missed time  5. Left VMO contusion, missed 2 games      PHYSICAL EXAM:      GEN: Alert and oriented x3. In no apparent distress.     Well-developed, well-nourished male. Observation of ears, eyes, and nose   reveal no gross abnormalities.  See ophthal report for full eye exam    CERVICAL SPINE: Full range of motion with no deficits.     BILATERAL SHOULDER EXAM: shows full symmetric range of motion with 5/5   strength throughout the supraspinatus and infraspinatus, deltoid, and   subscapularis. No evidence of instability.     BILATERAL ELBOW EXAM: Shows full and symmetric extension/flexion, and   pronation/supination, and varus/valgus stability. Negative posterolateral   rotatory instability.     BILATERAL WRIST EXAM: Shows normal and symmetric full flexion/extension   and radial/ulnar deviation.     HAND EXAM: Shows full range of motion to bilateral hands and full   strength and stability to all fingers.     LUMBAR SPINE EXAM: Shows no evidence of any scoliosis. He has full   flexion and extension with no pain and no apparent tenderness to the   spine. Negative straight leg raise bilaterally.     HIP EXAM: Shows full range of motion without pain or signs of impingement.   Symmetric internal rotation without pain.  Has 5/5 hip flexion strength and 5/5 strength testing to the entire lower extremity.     KNEE EXAM: Examination of bilateral knees shows symmetric  range of motion   0 to 145 degrees with negative Lachman and stable to varus-valgus stress   testing. No joint line tenderness. Good quad tone. No effusion of either knee. + TTP Left VMO, mild swelling diffusely.    Bilateral legs: NTTP over tibiae mid shaft or distal.      FOOT AND ANKLE EXAM: Shows good range of motion to bilateral ankles with   no neurologic deficit. There is a 5/5 strength exam throughout the foot   and ankle exam. There is a normal arch on both feet. There is no tenderness to palpation along either foot. - turf toe exam  L great toe with loss of 10 degrees extension, symmetric flexion  Stable Lachman  L Medial 1st MTP capsular mild edema    IMAGING:   Xrays: NA      ASSESSMENT:   Left VMO contusion, MRI left knee

## 2019-01-23 RX ORDER — METHYLPREDNISOLONE 4 MG/1
TABLET ORAL
Qty: 21 TABLET | Refills: 0 | Status: SHIPPED | OUTPATIENT
Start: 2019-01-23

## 2019-02-20 ENCOUNTER — CLINICAL SUPPORT (OUTPATIENT)
Dept: ADMINISTRATIVE | Facility: CLINIC | Age: 29
End: 2019-02-20

## 2019-02-20 DIAGNOSIS — Z00.00 ROUTINE GENERAL MEDICAL EXAMINATION AT A HEALTH CARE FACILITY: Primary | ICD-10-CM

## 2019-03-07 ENCOUNTER — HOSPITAL ENCOUNTER (OUTPATIENT)
Dept: RADIOLOGY | Facility: HOSPITAL | Age: 29
Discharge: HOME | End: 2019-03-07
Attending: ORTHOPAEDIC SURGERY
Payer: COMMERCIAL

## 2019-03-07 DIAGNOSIS — M25.562 ACUTE PAIN OF LEFT KNEE: ICD-10-CM

## 2019-03-07 DIAGNOSIS — M25.562 ACUTE PAIN OF LEFT KNEE: Primary | ICD-10-CM

## 2019-03-07 PROCEDURE — 73721 MRI KNEE WITHOUT CONTRAST LEFT: ICD-10-PCS | Mod: 26,LT,, | Performed by: RADIOLOGY

## 2019-03-07 PROCEDURE — 73721 MRI JNT OF LWR EXTRE W/O DYE: CPT | Mod: TC,LT

## 2019-03-07 PROCEDURE — 73721 MRI JNT OF LWR EXTRE W/O DYE: CPT | Mod: 26,LT,, | Performed by: RADIOLOGY

## 2019-03-13 DIAGNOSIS — B35.6 TINEA CRURIS: Primary | ICD-10-CM

## 2019-03-13 NOTE — TELEPHONE ENCOUNTER
Dr. Barboza requested the following for this patient:    Itraconzole 200mg po qd x 28 days (0 refills)  Dx: Tinea Cruris

## 2019-04-10 ENCOUNTER — OFFICE VISIT (OUTPATIENT)
Dept: SPORTS MEDICINE | Facility: CLINIC | Age: 29
End: 2019-04-10

## 2019-04-10 DIAGNOSIS — Z00.00 PHYSICAL EXAM, ROUTINE: Primary | ICD-10-CM

## 2019-04-10 DIAGNOSIS — Z00.00 WELLNESS EXAMINATION: Primary | ICD-10-CM

## 2019-04-10 DIAGNOSIS — Z02.5 SPORTS PHYSICAL: ICD-10-CM

## 2019-04-10 PROCEDURE — 99499 NO LOS: ICD-10-PCS | Mod: S$GLB,,, | Performed by: FAMILY MEDICINE

## 2019-04-10 PROCEDURE — 99499 UNLISTED E&M SERVICE: CPT | Mod: S$GLB,,, | Performed by: FAMILY MEDICINE

## 2019-04-10 PROCEDURE — 99999 PR PBB SHADOW E&M-EST. PATIENT-LVL I: CPT | Mod: PBBFAC,,, | Performed by: ORTHOPAEDIC SURGERY

## 2019-04-10 PROCEDURE — 99499 NO LOS: ICD-10-PCS | Mod: S$GLB,,, | Performed by: ORTHOPAEDIC SURGERY

## 2019-04-10 PROCEDURE — 99999 PR PBB SHADOW E&M-EST. PATIENT-LVL I: ICD-10-PCS | Mod: PBBFAC,,, | Performed by: ORTHOPAEDIC SURGERY

## 2019-04-10 PROCEDURE — 99499 UNLISTED E&M SERVICE: CPT | Mod: S$GLB,,, | Performed by: ORTHOPAEDIC SURGERY

## 2019-04-10 NOTE — PROGRESS NOTES
ZAHRA.C. Left knee pain    29 y.o. year-old [employer] .    Current History:  L VMO contusion: Overall doing well. States pain has greatly improved. Has returned to cutting, running, jumping at 85% for 45 minutes without pain or issues. Denies effusions/swelling    ORTHOPAEDIC HISTORY:   1. History of right hamstring strain March 2016 ([facility]).  Missed several games.  Conservative treatment.  No injections.  Returned in same month.  Played without issues rest of season.  No symptoms at end of season.  No current symptoms  2. Right anterior talus contusion (Oct 12, 2016)  3. Left 1st MT dorsal bone contusion (Nov 2016)   4. Fibular head contusion 1-26-17, no missed time  5. Jan 2019: Left VMO contusion, missed 2 games      PHYSICAL EXAM:      GEN: Alert and oriented x3. In no apparent distress.     Well-developed, well-nourished male. Observation of ears, eyes, and nose   reveal no gross abnormalities.  See ophthal report for full eye exam    CERVICAL SPINE: Full range of motion with no deficits.     BILATERAL SHOULDER EXAM: shows full symmetric range of motion with 5/5   strength throughout the supraspinatus and infraspinatus, deltoid, and   subscapularis. No evidence of instability.     BILATERAL ELBOW EXAM: Shows full and symmetric extension/flexion, and   pronation/supination, and varus/valgus stability. Negative posterolateral   rotatory instability.     BILATERAL WRIST EXAM: Shows normal and symmetric full flexion/extension   and radial/ulnar deviation.     HAND EXAM: Shows full range of motion to bilateral hands and full   strength and stability to all fingers.     LUMBAR SPINE EXAM: Shows no evidence of any scoliosis. He has full   flexion and extension with no pain and no apparent tenderness to the   spine. Negative straight leg raise bilaterally.     HIP EXAM: Shows full range of motion without pain or signs of impingement.   Symmetric internal rotation without pain.  Has 5/5 hip flexion strength  and 5/5 strength testing to the entire lower extremity.     KNEE EXAM: Examination of bilateral knees shows symmetric range of motion   0 to 145 degrees with negative Lachman and stable to varus-valgus stress   testing. No joint line tenderness. Good quad tone. No effusion of either knee. - TTP Left VMO, swelling resolved.    Bilateral legs: NTTP over tibiae mid shaft or distal.      FOOT AND ANKLE EXAM: Shows good range of motion to bilateral ankles with   no neurologic deficit. There is a 5/5 strength exam throughout the foot   and ankle exam. There is a normal arch on both feet. There is no tenderness to palpation along either foot. - turf toe exam  L great toe with loss of 10 degrees extension, symmetric flexion  Stable Lachman  L Medial 1st MTP capsular mild edema    IMAGING:   Xrays: NA      ASSESSMENT:   Left VMO contusion, MRI left knee    Cont rehab

## 2019-06-18 NOTE — PROGRESS NOTES
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      Sports-relevant history and family history:  History of cardiovascular pathology: no  Family history of cardiovascular pathology at a young age: no  History of pulmonary pathology: no  History of asthma: no  History of concussion: no  Chronic medication use: no  Chronic supplement / vitamin use: one-a-day vitamin  Other current medical concerns: no  Other history per PPE: History Form (see electronic medical record)    Assessment:   #1 post season medical review  Concussion evaluation per [facility] protocol: last completed: 18.09.25  Stress echocardiogram per [facility] protocol: last performed with no evidence of stress induced myocardial ischemia on: 18.09.14    #2 ongoing medical issues:  A) none     Imaging studies reviewed as part of medical evaluation:   none     Plan:  #1  Complete.   Acute and ongoing concerns as below:     #2  A) none        Physical exam, routine Z00.00  Sports physical Z02.5        Template reviewed 19.03.25        References:  The Preparticipation Sports Evaluation, Sept 2015  https://www.aafp.org/afp/2015/0901/p371.html     Preparticipation Physical Evaluations in Sports: A Systematic Review of Current Recommendations, July 2014  [email or web address]     Preparticipation Physical Evaluation consensus document, 2010  https://www.aafp.org/dam/AAFP/documents/patient_care/fitness/lnfjoevrvhhadz0415.pdf  Https://www.amssm.org/Content/pdf%20files/QBI9495IijwdpdMdci.pdf     https://www.[employer]Net/contents/vitamin-d-deficiency-in-adults-definition-clinical-manifestations-and-treatment?search=vitamin%20d%20replacement&source=search_result&selectedTitle=1~150&usage_type=default&display_rank=1

## 2019-08-30 ENCOUNTER — TELEPHONE (OUTPATIENT)
Dept: SPORTS MEDICINE | Facility: CLINIC | Age: 29
End: 2019-08-30

## 2019-08-30 DIAGNOSIS — Z02.5 SPORTS PHYSICAL: Primary | ICD-10-CM

## 2019-09-11 ENCOUNTER — HOSPITAL ENCOUNTER (OUTPATIENT)
Dept: CARDIOLOGY | Facility: CLINIC | Age: 29
Discharge: HOME | End: 2019-09-11
Attending: FAMILY MEDICINE

## 2019-09-11 VITALS — HEIGHT: 76 IN | WEIGHT: 205 LBS | BODY MASS INDEX: 24.96 KG/M2

## 2019-09-11 DIAGNOSIS — Z02.5 SPORTS PHYSICAL: ICD-10-CM

## 2019-09-11 LAB
ASCENDING AORTA: 2.97 CM
AV INDEX (PROSTH): 0.9
AV MEAN GRADIENT: 2 MMHG
AV PEAK GRADIENT: 4 MMHG
AV VALVE AREA: 3.9 CM2
AV VELOCITY RATIO: 0.79
BSA FOR ECHO PROCEDURE: 2.23 M2
CV ECHO LV RWT: 0.29 CM
CV STRESS BASE HR: 40 BPM
DIASTOLIC BLOOD PRESSURE: 73 MMHG
DOP CALC AO PEAK VEL: 1.04 M/S
DOP CALC AO VTI: 21.45 CM
DOP CALC LVOT AREA: 4.3 CM2
DOP CALC LVOT DIAMETER: 2.35 CM
DOP CALC LVOT PEAK VEL: 0.82 M/S
DOP CALC LVOT STROKE VOLUME: 83.63 CM3
DOP CALCLVOT PEAK VEL VTI: 19.29 CM
E WAVE DECELERATION TIME: 122.43 MSEC
E/A RATIO: 2.63
E/E' RATIO: 4.5 M/S
ECHO LV POSTERIOR WALL: 0.83 CM (ref 0.6–1.1)
FRACTIONAL SHORTENING: 31 % (ref 28–44)
INTERVENTRICULAR SEPTUM: 0.88 CM (ref 0.6–1.1)
IVRT: 0.1 MSEC
LA MAJOR: 5.7 CM
LA MINOR: 5.3 CM
LA WIDTH: 4 CM
LEFT ATRIUM SIZE: 4.03 CM
LEFT ATRIUM VOLUME INDEX: 33.6 ML/M2
LEFT ATRIUM VOLUME: 75.26 CM3
LEFT INTERNAL DIMENSION IN SYSTOLE: 4 CM (ref 2.1–4)
LEFT VENTRICLE DIASTOLIC VOLUME INDEX: 66.51 ML/M2
LEFT VENTRICLE DIASTOLIC VOLUME: 148.91 ML
LEFT VENTRICLE MASS INDEX: 85 G/M2
LEFT VENTRICLE SYSTOLIC VOLUME INDEX: 35.5 ML/M2
LEFT VENTRICLE SYSTOLIC VOLUME: 79.5 ML
LEFT VENTRICULAR INTERNAL DIMENSION IN DIASTOLE: 5.8 CM (ref 3.5–6)
LEFT VENTRICULAR MASS: 190.67 G
LV LATERAL E/E' RATIO: 3.94 M/S
LV SEPTAL E/E' RATIO: 5.25 M/S
MV PEAK A VEL: 0.24 M/S
MV PEAK E VEL: 0.63 M/S
OHS CV CPX 1 MINUTE RECOVERY HEART RATE: 120 BPM
OHS CV CPX 85 PERCENT MAX PREDICTED HEART RATE MALE: 162
OHS CV CPX ESTIMATED METS: 17
OHS CV CPX MAX PREDICTED HEART RATE: 190
OHS CV CPX PATIENT IS FEMALE: 0
OHS CV CPX PATIENT IS MALE: 1
OHS CV CPX PEAK DIASTOLIC BLOOD PRESSURE: 36 MMHG
OHS CV CPX PEAK HEAR RATE: 169 BPM
OHS CV CPX PEAK RATE PRESSURE PRODUCT: ABNORMAL
OHS CV CPX PEAK SYSTOLIC BLOOD PRESSURE: 139 MMHG
OHS CV CPX PERCENT MAX PREDICTED HEART RATE ACHIEVED: 89
OHS CV CPX RATE PRESSURE PRODUCT PRESENTING: 4720
PISA TR MAX VEL: 1.78 M/S
PULM VEIN S/D RATIO: 0.5
PV PEAK D VEL: 0.66 M/S
PV PEAK S VEL: 0.33 M/S
RA MAJOR: 5.1 CM
RA PRESSURE: 3 MMHG
RA WIDTH: 5.1 CM
RETIRED EF AND QEF - SEE NOTES: 52 %
RIGHT VENTRICULAR END-DIASTOLIC DIMENSION: 4.5 CM
RV TISSUE DOPPLER FREE WALL SYSTOLIC VELOCITY 1 (APICAL 4 CHAMBER VIEW): 11.37 CM/S
SINUS: 3.92 CM
STJ: 2.82 CM
STRESS ECHO POST EXERCISE DUR MIN: 14 MINUTES
STRESS ECHO POST EXERCISE DUR SEC: 2 SECONDS
SYSTOLIC BLOOD PRESSURE: 118 MMHG
TDI LATERAL: 0.16 M/S
TDI SEPTAL: 0.12 M/S
TDI: 0.14 M/S
TR MAX PG: 13 MMHG
TRICUSPID ANNULAR PLANE SYSTOLIC EXCURSION: 2.14 CM
TV REST PULMONARY ARTERY PRESSURE: 16 MMHG

## 2019-09-11 PROCEDURE — 93351 STRESS TTE COMPLETE: CPT

## 2019-09-11 PROCEDURE — 93320 STRESS ECHO (CUPID ONLY): ICD-10-PCS | Mod: 26,,, | Performed by: INTERNAL MEDICINE

## 2019-09-11 PROCEDURE — 93325 STRESS ECHO (CUPID ONLY): ICD-10-PCS | Mod: 26,,, | Performed by: INTERNAL MEDICINE

## 2019-09-11 PROCEDURE — 93351 STRESS TTE COMPLETE: CPT | Mod: 26,,, | Performed by: INTERNAL MEDICINE

## 2019-09-11 PROCEDURE — 93351 STRESS ECHO (CUPID ONLY): ICD-10-PCS | Mod: 26,,, | Performed by: INTERNAL MEDICINE

## 2019-09-11 PROCEDURE — 93320 DOPPLER ECHO COMPLETE: CPT | Mod: 26,,, | Performed by: INTERNAL MEDICINE

## 2019-09-11 PROCEDURE — 93325 DOPPLER ECHO COLOR FLOW MAPG: CPT | Mod: 26,,, | Performed by: INTERNAL MEDICINE

## 2019-09-17 ENCOUNTER — CLINICAL SUPPORT (OUTPATIENT)
Dept: INTERNAL MEDICINE | Facility: CLINIC | Age: 29
End: 2019-09-17

## 2019-09-17 DIAGNOSIS — Z00.00 ROUTINE GENERAL MEDICAL EXAMINATION AT A HEALTH CARE FACILITY: Primary | ICD-10-CM

## 2019-09-17 LAB
25(OH)D3+25(OH)D2 SERPL-MCNC: 41 NG/ML (ref 30–96)
ALBUMIN SERPL BCP-MCNC: 4.5 G/DL (ref 3.5–5.2)
ALP SERPL-CCNC: 84 U/L (ref 55–135)
ALT SERPL W/O P-5'-P-CCNC: 24 U/L (ref 10–44)
ANION GAP SERPL CALC-SCNC: 8 MMOL/L (ref 8–16)
AST SERPL-CCNC: 29 U/L (ref 10–40)
BACTERIA #/AREA URNS AUTO: NORMAL /HPF
BASOPHILS # BLD AUTO: 0.02 K/UL (ref 0–0.2)
BASOPHILS NFR BLD: 0.4 % (ref 0–1.9)
BILIRUB SERPL-MCNC: 1.5 MG/DL (ref 0.1–1)
BILIRUB UR QL STRIP: NEGATIVE
BUN SERPL-MCNC: 14 MG/DL (ref 6–20)
C TRACH DNA SPEC QL NAA+PROBE: NOT DETECTED
CALCIUM SERPL-MCNC: 9.6 MG/DL (ref 8.7–10.5)
CHLORIDE SERPL-SCNC: 105 MMOL/L (ref 95–110)
CLARITY UR REFRACT.AUTO: CLEAR
CO2 SERPL-SCNC: 28 MMOL/L (ref 23–29)
COLOR UR AUTO: YELLOW
CREAT SERPL-MCNC: 1.2 MG/DL (ref 0.5–1.4)
DIFFERENTIAL METHOD: ABNORMAL
EOSINOPHIL # BLD AUTO: 0 K/UL (ref 0–0.5)
EOSINOPHIL NFR BLD: 0.8 % (ref 0–8)
ERYTHROCYTE [DISTWIDTH] IN BLOOD BY AUTOMATED COUNT: 14.3 % (ref 11.5–14.5)
EST. GFR  (AFRICAN AMERICAN): >60 ML/MIN/1.73 M^2
EST. GFR  (NON AFRICAN AMERICAN): >60 ML/MIN/1.73 M^2
ESTIMATED AVG GLUCOSE: 105 MG/DL (ref 68–131)
GLUCOSE SERPL-MCNC: 98 MG/DL (ref 70–110)
GLUCOSE UR QL STRIP: NEGATIVE
HBA1C MFR BLD HPLC: 5.3 % (ref 4–5.6)
HCT VFR BLD AUTO: 42.3 % (ref 40–54)
HGB BLD-MCNC: 13.7 G/DL (ref 14–18)
HGB UR QL STRIP: NEGATIVE
IMM GRANULOCYTES # BLD AUTO: 0.01 K/UL (ref 0–0.04)
IMM GRANULOCYTES NFR BLD AUTO: 0.2 % (ref 0–0.5)
KETONES UR QL STRIP: NEGATIVE
LEUKOCYTE ESTERASE UR QL STRIP: NEGATIVE
LYMPHOCYTES # BLD AUTO: 1.9 K/UL (ref 1–4.8)
LYMPHOCYTES NFR BLD: 38.4 % (ref 18–48)
MCH RBC QN AUTO: 26.7 PG (ref 27–31)
MCHC RBC AUTO-ENTMCNC: 32.4 G/DL (ref 32–36)
MCV RBC AUTO: 82 FL (ref 82–98)
MICROSCOPIC COMMENT: NORMAL
MONOCYTES # BLD AUTO: 0.3 K/UL (ref 0.3–1)
MONOCYTES NFR BLD: 6 % (ref 4–15)
N GONORRHOEA DNA SPEC QL NAA+PROBE: NOT DETECTED
NEUTROPHILS # BLD AUTO: 2.6 K/UL (ref 1.8–7.7)
NEUTROPHILS NFR BLD: 54.2 % (ref 38–73)
NITRITE UR QL STRIP: NEGATIVE
NRBC BLD-RTO: 0 /100 WBC
PH UR STRIP: 6 [PH] (ref 5–8)
PLATELET # BLD AUTO: 159 K/UL (ref 150–350)
PMV BLD AUTO: 14.1 FL (ref 9.2–12.9)
POTASSIUM SERPL-SCNC: 4.1 MMOL/L (ref 3.5–5.1)
PROT SERPL-MCNC: 8.3 G/DL (ref 6–8.4)
PROT UR QL STRIP: NEGATIVE
RBC # BLD AUTO: 5.14 M/UL (ref 4.6–6.2)
RBC #/AREA URNS AUTO: 0 /HPF (ref 0–4)
SODIUM SERPL-SCNC: 141 MMOL/L (ref 136–145)
SP GR UR STRIP: 1.02 (ref 1–1.03)
SQUAMOUS #/AREA URNS AUTO: 0 /HPF
URN SPEC COLLECT METH UR: NORMAL
WBC # BLD AUTO: 4.87 K/UL (ref 3.9–12.7)
WBC #/AREA URNS AUTO: 1 /HPF (ref 0–5)

## 2019-09-17 PROCEDURE — 86480 TB TEST CELL IMMUN MEASURE: CPT

## 2019-09-17 PROCEDURE — 87086 URINE CULTURE/COLONY COUNT: CPT

## 2019-09-17 PROCEDURE — 82306 VITAMIN D 25 HYDROXY: CPT

## 2019-09-17 PROCEDURE — 81001 URINALYSIS AUTO W/SCOPE: CPT

## 2019-09-17 PROCEDURE — 83036 HEMOGLOBIN GLYCOSYLATED A1C: CPT

## 2019-09-17 PROCEDURE — 80053 COMPREHEN METABOLIC PANEL: CPT

## 2019-09-17 PROCEDURE — 87491 CHLMYD TRACH DNA AMP PROBE: CPT

## 2019-09-17 PROCEDURE — 85025 COMPLETE CBC W/AUTO DIFF WBC: CPT

## 2019-09-18 ENCOUNTER — TELEPHONE (OUTPATIENT)
Dept: GASTROENTEROLOGY | Facility: CLINIC | Age: 29
End: 2019-09-18

## 2019-09-18 LAB
BACTERIA UR CULT: NO GROWTH
M TB IFN-G CD4+ BCKGRND COR BLD-ACNC: -0.01 IU/ML
MITOGEN IGNF BCKGRD COR BLD-ACNC: 8.33 IU/ML
MITOGEN IGNF BCKGRD COR BLD-ACNC: NEGATIVE [IU]/ML
NIL: 0.05 IU/ML
TB2 - NIL: -0.01 IU/ML

## 2019-09-18 NOTE — TELEPHONE ENCOUNTER
Received test results in my in-basket on this patient that I have never seen before. I contacted the lab to investigate to see who ordered the labs under my name. The  informed me that the orders we placed by Adelaide Huizar. I contacted Adelaide and she reports she will correct this error. I routed the test results to Dr. Shadi Adler, whom is the provider that Adelaide informed me the orders were suppose to be under.

## 2019-09-30 ENCOUNTER — OFFICE VISIT (OUTPATIENT)
Dept: SPORTS MEDICINE | Facility: CLINIC | Age: 29
End: 2019-09-30

## 2019-09-30 VITALS — HEART RATE: 56 BPM | SYSTOLIC BLOOD PRESSURE: 141 MMHG | DIASTOLIC BLOOD PRESSURE: 81 MMHG

## 2019-09-30 DIAGNOSIS — Z00.00 ROUTINE MEDICAL EXAM: Primary | ICD-10-CM

## 2019-09-30 DIAGNOSIS — Z02.5 SPORTS PHYSICAL: Primary | ICD-10-CM

## 2019-09-30 DIAGNOSIS — Z02.5 ROUTINE SPORTS EXAMINATION: ICD-10-CM

## 2019-09-30 PROCEDURE — 99499 UNLISTED E&M SERVICE: CPT | Mod: S$GLB,,, | Performed by: ORTHOPAEDIC SURGERY

## 2019-09-30 PROCEDURE — 99499 NO LOS: ICD-10-PCS | Mod: S$GLB,,, | Performed by: ORTHOPAEDIC SURGERY

## 2019-09-30 PROCEDURE — 99999 PR PBB SHADOW E&M-EST. PATIENT-LVL II: CPT | Mod: PBBFAC,,, | Performed by: FAMILY MEDICINE

## 2019-09-30 PROCEDURE — 99499 UNLISTED E&M SERVICE: CPT | Mod: S$GLB,,, | Performed by: FAMILY MEDICINE

## 2019-09-30 PROCEDURE — 99499 NO LOS: ICD-10-PCS | Mod: S$GLB,,, | Performed by: FAMILY MEDICINE

## 2019-09-30 PROCEDURE — 99999 PR PBB SHADOW E&M-EST. PATIENT-LVL II: ICD-10-PCS | Mod: PBBFAC,,, | Performed by: FAMILY MEDICINE

## 2019-09-30 NOTE — PROGRESS NOTES
DARRIUS. Left knee pain    29 y.o. year-old [employer] .    Current History:  S/p L VMO x 2 contusion: Overall doing well. States pain has greatly improved. Has returned to cutting, running, jumping without pain or issues. Denies effusions/swelling    ORTHOPAEDIC HISTORY:   1. History of right hamstring strain March 2016 ([facility]).  Missed several games.  Conservative treatment.  No injections.  Returned in same month.  Played without issues rest of season.  No symptoms at end of season.  No current symptoms  2. Right anterior talus contusion (Oct 12, 2016)  3. Left 1st MT dorsal bone contusion (Nov 2016)   4. Fibular head contusion 1-26-17, no missed time  5. Jan 2019: Left VMO contusion, missed 2 games      PHYSICAL EXAM:      GEN: Alert and oriented x3. In no apparent distress.     Well-developed, well-nourished male. Observation of ears, eyes, and nose   reveal no gross abnormalities.  See ophthal report for full eye exam    CERVICAL SPINE: Full range of motion with no deficits.     BILATERAL SHOULDER EXAM: shows full symmetric range of motion with 5/5   strength throughout the supraspinatus and infraspinatus, deltoid, and   subscapularis. No evidence of instability.     BILATERAL ELBOW EXAM: Shows full and symmetric extension/flexion, and   pronation/supination, and varus/valgus stability. Negative posterolateral   rotatory instability.     BILATERAL WRIST EXAM: Shows normal and symmetric full flexion/extension   and radial/ulnar deviation.     HAND EXAM: Shows full range of motion to bilateral hands and full   strength and stability to all fingers.     LUMBAR SPINE EXAM: Shows no evidence of any scoliosis. He has full   flexion and extension with no pain and no apparent tenderness to the   spine. Negative straight leg raise bilaterally.     HIP EXAM: Shows full range of motion without pain or signs of impingement.   Symmetric internal rotation without pain.  Has 5/5 hip flexion strength and 5/5  strength testing to the entire lower extremity.   BLE  ROM: (* = pain)    Flexion:    120 degrees  External rotation: 45 degrees  Internal rotation with axial load: 45 degrees  Internal rotation without axial load: 55 degrees  Abduction:  45 degrees  Adduction:   20 degrees       KNEE EXAM: Examination of bilateral knees shows symmetric range of motion   0 to 145 degrees with negative Lachman and stable to varus-valgus stress   testing. No joint line tenderness. Good quad tone. No effusion of either knee. - TTP Left VMO, swelling resolved.    Bilateral legs: NTTP over tibiae mid shaft or distal.      FOOT AND ANKLE EXAM: Shows good range of motion to bilateral ankles with   no neurologic deficit. There is a 5/5 strength exam throughout the foot   and ankle exam. There is a normal arch on both feet. There is no tenderness to palpation along either foot. - turf toe exam  Stable Lachman  L ankle - 14 degrees dorsiflexion  R ankle- 15 degrees dorsiflexion    IMAGING:   Xrays: NA      ASSESSMENT:   Cleared for participation this season 2019-20

## 2019-09-30 NOTE — PROGRESS NOTES
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     Sports-relevant past history and family history:  History of cardiovascular pathology: no  Family history of cardiovascular pathology at a young age: no  History of pulmonary pathology: no  History of asthma: no  History of concussion: no  Chronic medication use: no  Chronic supplement / vitamin use: one-a-day vitamin, dermaflex  Other current medical concerns: no  Other history per PPE: History Form (see electronic medical record)    Assessment:   #1 pre season medical evaluation  Concussion baseline evaluation per [facility] protocol: last completed 19.09.30  Stress echocardiogram per [facility] protocol: last performed with no evidence of stress induced myocardial ischemia on: 19.09.11    #2 ongoing medical issues:   A) none    Imaging studies reviewed as part of medical evaluation:   none    Plan:  #1  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    #2  A) none      Physical exam, routine Z00.00  Sports physical Z02.5    Template reviewed 18.09.25    References:  The Preparticipation Sports Evaluation, Sept 2015  https://www.aafp.org/afp/2015/0901/p371.html    Preparticipation Physical Evaluations in Sports: A Systematic Review of Current Recommendations, July 2014  [email or web address]    Preparticipation Physical Evaluation consensus document, 2010  https://www.aafp.org/dam/AAFP/documents/patient_care/fitness/jagggacozvcehz0242.pdf  Https://www.amssm.org/Content/pdf%20files/ICA4655HjukedlLmoj.pdf

## 2020-03-15 ENCOUNTER — OFFICE VISIT (OUTPATIENT)
Dept: URGENT CARE | Facility: CLINIC | Age: 30
End: 2020-03-15
Payer: COMMERCIAL

## 2020-03-15 VITALS
OXYGEN SATURATION: 98 % | SYSTOLIC BLOOD PRESSURE: 110 MMHG | TEMPERATURE: 98 F | RESPIRATION RATE: 18 BRPM | HEART RATE: 45 BPM | BODY MASS INDEX: 24.87 KG/M2 | WEIGHT: 200 LBS | HEIGHT: 75 IN | DIASTOLIC BLOOD PRESSURE: 71 MMHG

## 2020-03-15 DIAGNOSIS — Z20.2 STD EXPOSURE: Primary | ICD-10-CM

## 2020-03-15 LAB
BILIRUB UR QL STRIP: NEGATIVE
GLUCOSE UR QL STRIP: NEGATIVE
KETONES UR QL STRIP: NEGATIVE
LEUKOCYTE ESTERASE UR QL STRIP: NEGATIVE
PH, POC UA: 6 (ref 5–8)
POC BLOOD, URINE: NEGATIVE
POC NITRATES, URINE: NEGATIVE
PROT UR QL STRIP: NEGATIVE
SP GR UR STRIP: 1.02 (ref 1–1.03)
UROBILINOGEN UR STRIP-ACNC: NORMAL (ref 0.3–2.2)

## 2020-03-15 PROCEDURE — 87661 TRICHOMONAS VAGINALIS AMPLIF: CPT

## 2020-03-15 PROCEDURE — 87491 CHLMYD TRACH DNA AMP PROBE: CPT

## 2020-03-15 PROCEDURE — 99213 OFFICE O/P EST LOW 20 MIN: CPT | Mod: 25,S$GLB,, | Performed by: PHYSICIAN ASSISTANT

## 2020-03-15 PROCEDURE — 81003 URINALYSIS AUTO W/O SCOPE: CPT | Mod: QW,S$GLB,, | Performed by: PHYSICIAN ASSISTANT

## 2020-03-15 PROCEDURE — 81003 POCT URINALYSIS, DIPSTICK, AUTOMATED, W/O SCOPE: ICD-10-PCS | Mod: QW,S$GLB,, | Performed by: PHYSICIAN ASSISTANT

## 2020-03-15 PROCEDURE — 99213 PR OFFICE/OUTPT VISIT, EST, LEVL III, 20-29 MIN: ICD-10-PCS | Mod: 25,S$GLB,, | Performed by: PHYSICIAN ASSISTANT

## 2020-03-15 NOTE — PROGRESS NOTES
"Subjective:       Patient ID: Lionel Dillard is a 30 y.o. male.    Vitals:  height is 6' 3" (1.905 m) and weight is 90.7 kg (200 lb). His oral temperature is 97.9 °F (36.6 °C). His blood pressure is 110/71 and his pulse is 45 (abnormal). His respiration is 18 and oxygen saturation is 98%.     Chief Complaint: Exposure to STD    Mr. Dillard presents for evaluation of STD exposure.  He reports recent unprotected sex with a new female partner and would like to be screened.  He denies any penile discharge, pain with ejaculation, scrotal pain, scrotal swelling, abdominal pain, fevers, chills.  He does not want blood work done today.    Exposure to STD   The patient's pertinent negatives include no penile discharge, penile pain, scrotal swelling or testicular pain. Pertinent negatives include no abdominal pain, chills, dysuria, fever, frequency, nausea, rash, urgency or vomiting.       Constitution: Negative for chills and fever.   Neck: Negative for painful lymph nodes.   Gastrointestinal: Negative for abdominal pain, nausea and vomiting.   Genitourinary: Negative for dysuria, frequency, urgency, urine decreased, hematuria, history of kidney stones, genital trauma, painful intercourse, genital sore, penile discharge, painful ejaculation, penile pain, penile swelling, scrotal swelling and testicular pain.   Musculoskeletal: Negative for back pain.   Skin: Negative for rash and lesion.   Hematologic/Lymphatic: Negative for swollen lymph nodes.       Objective:      Physical Exam   Constitutional: He is oriented to person, place, and time. He appears well-developed and well-nourished. No distress.   HENT:   Head: Normocephalic and atraumatic.   Right Ear: External ear normal.   Left Ear: External ear normal.   Nose: Nose normal. No nasal deformity. No epistaxis.   Mouth/Throat: Oropharynx is clear and moist and mucous membranes are normal.   Eyes: Conjunctivae and lids are normal.   Neck: Trachea normal, normal range of motion " and phonation normal. Neck supple.   Cardiovascular: Normal rate and intact distal pulses.   Pulmonary/Chest: Effort normal.   Abdominal: Soft. Normal appearance and bowel sounds are normal. He exhibits no distension. There is no tenderness. There is no CVA tenderness.   Musculoskeletal: Normal range of motion.   Neurological: He is alert and oriented to person, place, and time. He has normal reflexes.   Skin: Skin is warm, dry, intact and not diaphoretic.   Psychiatric: He has a normal mood and affect. His speech is normal and behavior is normal. Judgment and thought content normal.   Nursing note and vitals reviewed.        Results for orders placed or performed in visit on 03/15/20   POCT Urinalysis, Dipstick, Automated, W/O Scope   Result Value Ref Range    POC Blood, Urine Negative Negative    POC Bilirubin, Urine Negative Negative    POC Urobilinogen, Urine norm 0.3 - 2.2    POC Ketones, Urine Negative Negative    POC Protein, Urine Negative Negative    POC Nitrates, Urine Negative Negative    POC Glucose, Urine Negative Negative    pH, UA 6.0 5 - 8    POC Specific Gravity, Urine 1.020 1.003 - 1.029    POC Leukocytes, Urine Negative Negative       Assessment:       1. STD exposure        Plan:         STD exposure  -     POCT Urinalysis, Dipstick, Automated, W/O Scope  -     C. trachomatis/N. gonorrhoeae by AMP DNA [facility]; Urine  -     Trichomonas vaginalis, RNA, Qual, Urine (Males Only)      Patient Instructions     PLEASE READ YOUR DISCHARGE INSTRUCTIONS ENTIRELY AS IT CONTAINS IMPORTANT INFORMATION.  Testing was sent today.  We will call with your results in 3-4 days.  - Rest.    - Drink plenty of fluids.    - Tylenol or Ibuprofen as directed as needed for fever/pain.    - If you were prescribed antibiotics, please take them to completion.  - If you are female and on birth control pills - please use additional methods of contraception to prevent pregnancy while on antibiotics and for one cycle after.   -  If you were prescribed a narcotic medication or muscle relaxer, do not drive or operate heavy equipment or machinery while taking these medications, as they can cause drowsiness.   - If you smoke, please stop smoking.  -You must understand that you've received an Urgent Care treatment only and that you may be released before all your medical problems are known or treated. You, the patient, will    arrange for follow up care as instructed. Please arrange follow up with your primary medical clinic as soon as possible.   - Follow up with your PCP or specialty clinic as directed in the next 1-2 weeks if not improved or as needed.  You can call [phone] to schedule an appointment with the appropriate provider.    - Please return to Urgent Care or to the Emergency Department if your symptoms worsen.    Patient aware and verbalized understanding.    If You Think You Have an STD  Treating a sexually transmitted disease (STD) early limits the problems they can cause. If you have an STD, get treated right away. Ask your partner to be tested, too. Then avoid sex until youve finished treatment and your healthcare provider says its OK to have sex again.    Follow your treatment plan  Treatment depends on the type of STD you have. Common treatments include injections and oral pills or liquids. Creams and gels can be applied to sores caused by certain STDs. Follow the tips below:  · Get new treatment for each new STD.  · Dont use old medicine, even for the same STD. Use medicines as directed.  · Dont share medicine unless instructed to do so by your healthcare provider or clinic.  Talk to your partner  If you have an STD, its your duty to tell all your recent partners so they can be tested and treated. This is one important way to prevent the disease from being spread. Telling a partner that you have an STD can be hard. You may be embarrassed, angry, or afraid. Its often unclear who had the STD first. So try not to  place blame. Your healthcare provider may offer some advice on how to begin.  Prevent future problems  Even after youve been treated, you can still be infected again. This is a common problem. It happens when a partner passes the STD back to you. To avoid this, your partner must be tested. He or she may also need treatment. After treatment, go to any scheduled follow-up visits. Then prevent future problems with safer sex. Limit your number of partners and always use a latex condom.  Diagnosing STDS  Your healthcare provider will take a health history and examine you. During your health history, you will be asked about your sex habits and health history. You may also be asked about drug use. Give honest answers. Your healthcare provider will then check your body for signs of STDs. He or she also may perform one or more of the following tests:  · Fluid is swabbed from any sores. Samples also may be taken from the vagina, penis, mouth, or rectum. The samples are then tested for STDs.  · Blood or urine samples may be taken. They are checked for viruses or bacteria that cause STDs.  · For women, cells from the cervix (where the vagina and uterus meet) are checked for signs of cancer. This is called a Pap smear. If cell changes are found, a magnifying scope may be used to take a closer look (colposcopy).   Date Last Reviewed: 12/1/2016 © 2000-2017 [employer]. [address] [place] [address], [place] [address]. All rights reserved. This information is not intended as a substitute for professional medical care. Always follow your healthcare professional's instructions.

## 2020-03-15 NOTE — PATIENT INSTRUCTIONS
PLEASE READ YOUR DISCHARGE INSTRUCTIONS ENTIRELY AS IT CONTAINS IMPORTANT INFORMATION.  Testing was sent today.  We will call with your results in 3-4 days.  - Rest.    - Drink plenty of fluids.    - Tylenol or Ibuprofen as directed as needed for fever/pain.    - If you were prescribed antibiotics, please take them to completion.  - If you are female and on birth control pills - please use additional methods of contraception to prevent pregnancy while on antibiotics and for one cycle after.   - If you were prescribed a narcotic medication or muscle relaxer, do not drive or operate heavy equipment or machinery while taking these medications, as they can cause drowsiness.   - If you smoke, please stop smoking.  -You must understand that you've received an Urgent Care treatment only and that you may be released before all your medical problems are known or treated. You, the patient, will    arrange for follow up care as instructed. Please arrange follow up with your primary medical clinic as soon as possible.   - Follow up with your PCP or specialty clinic as directed in the next 1-2 weeks if not improved or as needed.  You can call [phone] to schedule an appointment with the appropriate provider.    - Please return to Urgent Care or to the Emergency Department if your symptoms worsen.    Patient aware and verbalized understanding.    If You Think You Have an STD  Treating a sexually transmitted disease (STD) early limits the problems they can cause. If you have an STD, get treated right away. Ask your partner to be tested, too. Then avoid sex until youve finished treatment and your healthcare provider says its OK to have sex again.    Follow your treatment plan  Treatment depends on the type of STD you have. Common treatments include injections and oral pills or liquids. Creams and gels can be applied to sores caused by certain STDs. Follow the tips below:  · Get new treatment for each new STD.  · Dont use  old medicine, even for the same STD. Use medicines as directed.  · Dont share medicine unless instructed to do so by your healthcare provider or clinic.  Talk to your partner  If you have an STD, its your duty to tell all your recent partners so they can be tested and treated. This is one important way to prevent the disease from being spread. Telling a partner that you have an STD can be hard. You may be embarrassed, angry, or afraid. Its often unclear who had the STD first. So try not to place blame. Your healthcare provider may offer some advice on how to begin.  Prevent future problems  Even after youve been treated, you can still be infected again. This is a common problem. It happens when a partner passes the STD back to you. To avoid this, your partner must be tested. He or she may also need treatment. After treatment, go to any scheduled follow-up visits. Then prevent future problems with safer sex. Limit your number of partners and always use a latex condom.  Diagnosing STDS  Your healthcare provider will take a health history and examine you. During your health history, you will be asked about your sex habits and health history. You may also be asked about drug use. Give honest answers. Your healthcare provider will then check your body for signs of STDs. He or she also may perform one or more of the following tests:  · Fluid is swabbed from any sores. Samples also may be taken from the vagina, penis, mouth, or rectum. The samples are then tested for STDs.  · Blood or urine samples may be taken. They are checked for viruses or bacteria that cause STDs.  · For women, cells from the cervix (where the vagina and uterus meet) are checked for signs of cancer. This is called a Pap smear. If cell changes are found, a magnifying scope may be used to take a closer look (colposcopy).   Date Last Reviewed: 12/1/2016  © 1125-9233 [employer] [address] [place] [address], [place] [address]. All rights  reserved. This information is not intended as a substitute for professional medical care. Always follow your healthcare professional's instructions.

## 2020-03-16 LAB
C TRACH DNA SPEC QL NAA+PROBE: NOT DETECTED
N GONORRHOEA DNA SPEC QL NAA+PROBE: NOT DETECTED

## 2020-03-17 LAB
T VAGINALIS RRNA SPEC QL NAA+PROBE: NOT DETECTED
TRICHOMONAS VAGINALIS RNA, QUAL, SOURCE: NORMAL

## 2020-05-08 ENCOUNTER — TELEPHONE (OUTPATIENT)
Dept: SPORTS MEDICINE | Facility: CLINIC | Age: 30
End: 2020-05-08

## 2020-05-08 DIAGNOSIS — Z01.84 ANTIBODY RESPONSE EXAMINATION: Primary | ICD-10-CM

## 2020-08-10 ENCOUNTER — OFFICE VISIT (OUTPATIENT)
Dept: SPORTS MEDICINE | Facility: CLINIC | Age: 30
End: 2020-08-10

## 2020-08-10 DIAGNOSIS — Z02.5 SPORTS PHYSICAL: Primary | ICD-10-CM

## 2020-08-10 PROCEDURE — 99999 PR PBB SHADOW E&M-EST. PATIENT-LVL I: CPT | Mod: PBBFAC,,, | Performed by: ORTHOPAEDIC SURGERY

## 2020-08-10 PROCEDURE — 99499 NO LOS: ICD-10-PCS | Mod: S$GLB,,, | Performed by: ORTHOPAEDIC SURGERY

## 2020-08-10 PROCEDURE — 99999 PR PBB SHADOW E&M-EST. PATIENT-LVL I: ICD-10-PCS | Mod: PBBFAC,,, | Performed by: ORTHOPAEDIC SURGERY

## 2020-08-10 PROCEDURE — 99499 UNLISTED E&M SERVICE: CPT | Mod: S$GLB,,, | Performed by: ORTHOPAEDIC SURGERY

## 2020-08-13 NOTE — PROGRESS NOTES
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  Interview was done in [place]. He has had no injuries or issues during his play within the bubble.      ORTHOPAEDIC HISTORY:     1. History of right hamstring strain March 2016 ([place]).  Missed several games.  Conservative treatment.  No injections.  Returned in same month.  Played without issues rest of season.  No symptoms at end of season.  No current symptoms  2. Right anterior talus contusion (Oct 12, 2016)  3. Left 1st MT dorsal bone contusion (Nov 2016)   4. Fibular head contusion 1-26-17, no missed time  5. Jan 2019: Left VMO contusion, missed 2 games    PHYSICAL EXAM:     GEN: Alert and oriented x3. In no apparent distress.   Well-developed, well-nourished male. Observation of ears, eyes, and nose reveal no gross abnormalities.     CERVICAL SPINE: Full range of motion with no deficits.     BILATERAL SHOULDER EXAM: shows full symmetric range of motion with 5/5   strength throughout the supraspinatus and infraspinatus, deltoid, and   subscapularis. No evidence of instability.     BILATERAL ELBOW EXAM: Shows full and symmetric extension/flexion, and pronation/supination, and varus/valgus stability. Negative posterolateral rotatory instability.     BILATERAL WRIST EXAM: Shows normal and symmetric full flexion/extension and radial/ulnar deviation.     HAND EXAM: Shows full range of motion to bilateral hands and full strength and stability to all fingers.     LUMBAR SPINE EXAM: Shows no evidence of any scoliosis. He has full flexion and extension with no pain and no apparent tenderness to the spine. Negative straight leg raise bilaterally.     HIP EXAM: Shows full range of motion without pain or signs of impingement.   Symmetric internal rotation without pain.  Has 5/5 hip flexion strength and 5/5 strength testing to the entire lower  extremity.   BLE  ROM:   (* = pain)    Flexion:                       120 degrees  External rotation:         45 degrees  Internal rotation with axial load: 45 degrees  Internal rotation without axial load: 55 degrees  Abduction:                   45 degrees  Adduction:                   20 degrees        KNEE EXAM: Examination of bilateral knees shows symmetric range of motion   0 to 145 degrees with negative Lachman and stable to varus-valgus stress   testing. No joint line tenderness. Good quad tone. No effusion of either knee. - TTP Left VMO, swelling resolved.     Bilateral legs: NTTP over tibiae mid shaft or distal.       FOOT AND ANKLE EXAM: Shows good range of motion to bilateral ankles with   no neurologic deficit. There is a 5/5 strength exam throughout the foot   and ankle exam. There is a normal arch on both feet. There is no tenderness to palpation along either foot. - turf toe exam  Stable Lachman  L ankle - 14 degrees dorsiflexion  R ankle- 15 degrees dorsiflexion    IMAGING:     None       INACTIVE PROBLEMS:     1. History of right hamstring strain March 2016 ([facility]).  Missed several games.  Conservative treatment.  No injections.  Returned in same month.  Played without issues rest of season.  No symptoms at end of season.  No current symptoms  2. Right anterior talus contusion (Oct 12, 2016)  3. Left 1st MT dorsal bone contusion (Nov 2016)   4. Fibular head contusion 1-26-17, no missed time  5. Jan 2019: Left VMO contusion, missed 2 games      ACTIVE PROBLEMS:     None     ASSESSMENT:     Lionel enters the 2020 off-season with no active orthopaedic issues.

## 2021-02-02 DIAGNOSIS — Z20.822 ENCOUNTER FOR LABORATORY TESTING FOR COVID-19 VIRUS: ICD-10-CM
